# Patient Record
Sex: FEMALE | Race: BLACK OR AFRICAN AMERICAN | Employment: UNEMPLOYED | URBAN - METROPOLITAN AREA
[De-identification: names, ages, dates, MRNs, and addresses within clinical notes are randomized per-mention and may not be internally consistent; named-entity substitution may affect disease eponyms.]

---

## 2020-11-08 ENCOUNTER — HOSPITAL ENCOUNTER (EMERGENCY)
Age: 20
Discharge: HOME | End: 2020-11-08
Attending: EMERGENCY MEDICINE
Payer: MEDICARE

## 2020-11-08 VITALS
OXYGEN SATURATION: 96 % | HEIGHT: 62 IN | HEART RATE: 96 BPM | RESPIRATION RATE: 14 BRPM | BODY MASS INDEX: 21.57 KG/M2 | DIASTOLIC BLOOD PRESSURE: 52 MMHG | SYSTOLIC BLOOD PRESSURE: 118 MMHG | TEMPERATURE: 98 F | WEIGHT: 117.2 LBS

## 2020-11-08 LAB
CHP ED QC CHECK: NORMAL
DIRECT EXAM: ABNORMAL
Lab: ABNORMAL
PREGNANCY TEST URINE, POC: NEGATIVE
SPECIMEN DESCRIPTION: ABNORMAL

## 2020-11-08 PROCEDURE — 99284 EMERGENCY DEPT VISIT MOD MDM: CPT

## 2020-11-08 PROCEDURE — 87491 CHLMYD TRACH DNA AMP PROBE: CPT

## 2020-11-08 PROCEDURE — 96372 THER/PROPH/DIAG INJ SC/IM: CPT

## 2020-11-08 PROCEDURE — 87480 CANDIDA DNA DIR PROBE: CPT

## 2020-11-08 PROCEDURE — 6360000002 HC RX W HCPCS: Performed by: NURSE PRACTITIONER

## 2020-11-08 PROCEDURE — 87510 GARDNER VAG DNA DIR PROBE: CPT

## 2020-11-08 PROCEDURE — 87591 N.GONORRHOEAE DNA AMP PROB: CPT

## 2020-11-08 PROCEDURE — 6370000000 HC RX 637 (ALT 250 FOR IP): Performed by: NURSE PRACTITIONER

## 2020-11-08 PROCEDURE — 87660 TRICHOMONAS VAGIN DIR PROBE: CPT

## 2020-11-08 RX ORDER — CEFTRIAXONE SODIUM 250 MG/1
250 INJECTION, POWDER, FOR SOLUTION INTRAMUSCULAR; INTRAVENOUS ONCE
Status: COMPLETED | OUTPATIENT
Start: 2020-11-08 | End: 2020-11-08

## 2020-11-08 RX ORDER — AZITHROMYCIN 250 MG/1
1000 TABLET, FILM COATED ORAL ONCE
Status: COMPLETED | OUTPATIENT
Start: 2020-11-08 | End: 2020-11-08

## 2020-11-08 RX ADMIN — CEFTRIAXONE SODIUM 250 MG: 250 INJECTION, POWDER, FOR SOLUTION INTRAMUSCULAR; INTRAVENOUS at 16:06

## 2020-11-08 RX ADMIN — AZITHROMYCIN MONOHYDRATE 1000 MG: 250 TABLET ORAL at 16:05

## 2020-11-08 NOTE — ED PROVIDER NOTES
EMERGENCY DEPARTMENT ENCOUNTER   ATTENDING ATTESTATION     Pt Name: Mulu Guaman  MRN: [id number]  [place] 2000  Date of evaluation: 11/8/20   Mulu Guaman is a 23 y.o. female with CC: No chief complaint on file. MDM:            CRITICAL CARE:       EKG: All EKG's are interpreted by the Emergency Department Physician who either signs or Co-signs this chart in the absence of a cardiologist.      RADIOLOGY:All plain film, CT, MRI, and formal ultrasound images (except ED bedside ultrasound) are read by the radiologist, see reports below, unless otherwise noted in MDM or here. No orders to display     LABS: All lab results were reviewed by myself, and all abnormals are listed below. Labs Reviewed   POCT URINE PREGNANCY - Normal   VAGINITIS DNA PROBE   C.TRACHOMATIS N.GONORRHOEAE DNA     CONSULTS:  None  FINAL IMPRESSION    No diagnosis found. PASTMEDICAL HISTORY   No past medical history on file. SURGICAL HISTORY     No past surgical history on file. CURRENT MEDICATIONS       Previous Medications    No medications on file     ALLERGIES     has No Known Allergies. FAMILY HISTORY     has no family status information on file. SOCIAL HISTORY       Social History     Tobacco Use    Smoking status: Never Smoker    Smokeless tobacco: Never Used   Substance Use Topics    Alcohol use: Not on file    Drug use: Not on file       I personally evaluated and examined the patient in conjunction with the APC and agree with the assessment, treatment plan, and disposition of the patient as recorded by the APC.    Luis Staton MD  Attending Emergency Physician          Luis Staton MD  83/84/87 [id number]

## 2020-11-10 LAB
C TRACH DNA GENITAL QL NAA+PROBE: NEGATIVE
N. GONORRHOEAE DNA: NEGATIVE
SPECIMEN DESCRIPTION: NORMAL

## 2020-11-10 ASSESSMENT — ENCOUNTER SYMPTOMS
RHINORRHEA: 0
VOMITING: 0
SORE THROAT: 0
SHORTNESS OF BREATH: 0
SINUS PRESSURE: 0
ABDOMINAL PAIN: 0
COLOR CHANGE: 0
CONSTIPATION: 0
DIARRHEA: 0
WHEEZING: 0
COUGH: 0
NAUSEA: 0

## 2020-11-10 NOTE — ED PROVIDER NOTES
[facility] ED  eMERGENCY dEPARTMENT eNCOUnter      Pt Name: Winifred Bermudez  MRN: [id number]  [place] 2000  Date of evaluation: 11/8/2020  Provider: [address] [place] [address] NP, APRN - CNP    CHIEF COMPLAINT     No chief complaint on file. HISTORY OF PRESENT ILLNESS  (Location/Symptom, Timing/Onset, Context/Setting, Quality, Duration, Modifying Factors, Severity.)   Winifred Bermudez is a 23 y.o. female who presents to the emergency department private vehicle for evaluation of vaginal discharge. The patient states that she is concerned because she found out that her significant other or her boyfriend that she is sleeping with had sexual intercourse with a woman. She states that she is worried that she could have an STD. She denies any open sores or lesions. Nursing Notes were reviewed. ALLERGIES     Patient has no known allergies. CURRENT MEDICATIONS     There are no discharge medications for this patient. PAST MEDICAL HISTORY   No past medical history on file. SURGICAL HISTORY     No past surgical history on file. FAMILY HISTORY     No family history on file. No family status information on file. SOCIAL HISTORY      reports that she has never smoked. She has never used smokeless tobacco.    REVIEW OF SYSTEMS    (2-9 systems for level 4, 10 or more for level 5)     Review of Systems   Constitutional: Negative for chills, fever and unexpected weight change. HENT: Negative for congestion, rhinorrhea, sinus pressure and sore throat. Respiratory: Negative for cough, shortness of breath and wheezing. Cardiovascular: Negative for chest pain and palpitations. Gastrointestinal: Negative for abdominal pain, constipation, diarrhea, nausea and vomiting. Genitourinary: Negative for dysuria and hematuria. Musculoskeletal: Negative for arthralgias and myalgias. Skin: Negative for color change and rash. Neurological: Negative for dizziness, weakness and headaches. Hematological: Negative for adenopathy. All other systems reviewed and are negative. Except as noted above the remainder of the review of systems was reviewed and negative. PHYSICAL EXAM    (up to 7 for level 4, 8 or more for level 5)     ED Triage Vitals   BP Temp Temp src Pulse Resp SpO2 Height Weight   11/08/20 1448 11/08/20 1448 -- 11/08/20 1448 11/08/20 1448 11/08/20 1448 11/08/20 1449 11/08/20 1449   (!) 118/52 98 °F (36.7 °C)  96 14 96 % 5' 2\" (1.575 m) 117 lb 3.2 oz (53.2 kg)       Physical Exam  Vitals signs reviewed. Constitutional:       Appearance: She is well-developed. HENT:      Head: Normocephalic and atraumatic. Eyes:      Conjunctiva/sclera: Conjunctivae normal.      Pupils: Pupils are equal, round, and reactive to light. Neck:      Musculoskeletal: Normal range of motion and neck supple. Cardiovascular:      Rate and Rhythm: Normal rate and regular rhythm. Pulmonary:      Effort: Pulmonary effort is normal. No respiratory distress. Breath sounds: Normal breath sounds. No stridor. Abdominal:      General: Bowel sounds are normal.      Palpations: Abdomen is soft. Genitourinary:     Comments: No vaginal sores  Musculoskeletal: Normal range of motion. Lymphadenopathy:      Cervical: No cervical adenopathy. Skin:     General: Skin is warm and dry. Findings: No rash. Neurological:      Mental Status: She is alert and oriented to person, place, and time. LABS:  Labs Reviewed   VAGINITIS DNA PROBE - Abnormal; Notable for the following components:       Result Value    Direct Exam POSITIVE for Gardnerella vaginalis. (*)     Direct Exam POSITIVE for Candida sp. (*)     All other components within normal limits   POCT URINE PREGNANCY - Normal   C.TRACHOMATIS N.GONORRHOEAE DNA       All other labs were within normal range or not returned as of this dictation.     EMERGENCY DEPARTMENT COURSE and DIFFERENTIAL DIAGNOSIS/MDM:   Vitals:    Vitals:    11/08/20 1448 11/08/20 1449   BP: (!) 118/52    Pulse: 96    Resp: 14    Temp: 98 °F (36.7 °C)    SpO2: 96%    Weight:  117 lb 3.2 oz (53.2 kg)   Height:  5' 2\" (1.575 m)       Medical Decision Making: Significant other was going to leave her so she left without her swabs  Medications   cefTRIAXone (ROCEPHIN) injection 250 mg (250 mg Intramuscular Given 11/8/20 1606)   azithromycin (ZITHROMAX) tablet 1,000 mg (1,000 mg Oral Given 11/8/20 1605)       FINAL IMPRESSION    No diagnosis found. DISPOSITION/PLAN   DISPOSITION Eloped - Left Before Treatment Complete 11/08/2020 04:46:41 PM      PATIENT REFERRED TO:   No follow-up provider specified. DISCHARGE MEDICATIONS:   There are no discharge medications for this patient.           (Please note that portions of this note were completed with a voice recognition program.  Efforts were made to edit the dictations but occasionally words are mis-transcribed.)    [id number] [facility] NICKI, JOSE - CNP  Certified Nurse Practitioner          JOSE Chavarria CNP  11/10/20 0010